# Patient Record
(demographics unavailable — no encounter records)

---

## 2020-08-12 NOTE — 2DMMODE
[facility]
[place]   [address]                   2 D/M-MODE ECHOCARDIOGRAM     
_______________________________________________________________________________
 
Name:       AUSTEN MATA                  Room #:                     REG [facility]#:      [id number]                       Account #:      [id number]  
Admission:  20    Attend Phys:    Raman Parrish MD
Discharge:              Date of Birth:  53  
                                                          Report #: [id number]
                                                                    [id number]
_______________________________________________________________________________
THIS REPORT FOR:  
 
cc:  Marely Kelly Beth RNP Park, Jin S. MD                                                   
                                                                       ~
 
--------------- APPROVED REPORT --------------
 
 
Study performed:  2020 13:36:26
 
EXAM: Comprehensive 2D, Doppler, and color-flow 
Echocardiogram 
Patient Location: Out-Patient   
      Status:  routine
 
   BSA:         2.06
HR: 87 bpm  
Rhythm: NSR  
 
Other Information 
Study Quality: Good
 
Indications
Palpitations
 
2D Dimensions
RVDd:  38.64 mm  
IVSd:  10.03 (7-11mm) LVOT Diam:  19.50 (18-24mm) 
LVDd:  48.63 mm  
PWd:  9.85 (7-11mm) Ascending Ao:  34.30 (22-36mm)
LVDs:  26.81 (25-40mm) 
Aortic Root:  37.34 mm 
 
Volumes
Left Atrial Volume (Systole) 
Single Plane 4CH:  48.97 mL Single Plane 2CH:  54.88 mL
    LA ESV Index:  27.00 mL/m2
 
Aortic Valve
AoV Peak Evelio.:  1.92 m/s 
AO Peak Gr.:  14.81 mmHg LVOT Max P.50 mmHg
    LVOT Max V:  1.70 m/s
HUA Vmax: 2.63 cm2  
 
 
 
[facility]
[address]
[place]  [address]
Phone:  [phone]                    2 D/M-MODE ECHOCARDIOGRAM     
_______________________________________________________________________________
 
Name:            AUSTEN MATA                  Room #:                    REG [facility]#:           [id number]          Account #:     [id number]  
Admission:       20         Attend Phys:   Raman Parrish
Discharge:                  Date of Birth: 53  
                         Report #:      [id number]
        [id number]
_______________________________________________________________________________
Mitral Valve
    E/A Ratio:  0.9
    MV Decel. Time:  186.31 ms
MV E Max Evelio.:  0.67 m/s 
MV A Evelio.:  0.77 m/s  
MV PHT:  54.03 ms  
IVRT:  66.90 ms   
 
Pulmonary Valve
PV Peak Evelio.:  1.44 m/s PV Peak Gr.:  8.33 mmHg
 
Pulmonary Vein
P Vein S:    0.79 m/s 
P Vein D:   0.40 m/s 
P Vein S/D Ratio:  1.98 
 
Tricuspid Valve
 RAP Estimate:  5.00 mmHg
 
Left Ventricle
The left ventricle is normal size. There is normal LV segmental wall 
motion. Mild basal septal hypertrophy is present. Left ventricular 
systolic function is normal. LVEF is 65-70%. Mild diastolic 
dysfunction is present (impaired relaxation pattern).
 
Right Ventricle
The right ventricle is normal size. The right ventricular systolic 
function is normal.
 
Atria
The left atrium size is normal. The right atrium size is 
normal.
 
Aortic Valve
The aortic valve is normal in structure. No aortic regurgitation is 
present. There is no aortic valvular stenosis.
 
Mitral Valve
The mitral valve is normal in structure. Trace mitral regurgitation. 
No evidence of mitral valve stenosis.
 
Tricuspid Valve
The tricuspid valve is normal in structure. There is no tricuspid 
valve regurgitation noted. Unable to assess PA pressure.
 
Pulmonic Valve
 
 
[facility]
[address]
[place]  [address]
Phone:  [phone]                    2 D/M-MODE ECHOCARDIOGRAM     
_______________________________________________________________________________
 
Name:            AUSTEN MATA                  Room #:                    REG CL
M.R.#:           [id number]          Account #:     [id number]  
Admission:       20         Attend Phys:   Raman Parrish
Discharge:                  Date of Birth: 53  
                         Report #:      [id number]
        [id number]
_______________________________________________________________________________
The pulmonary valve is normal in structure. Trace pulmonic 
regurgitation.
 
Great Vessels
The aortic root is normal in size. The ascending aorta is normal in 
size. IVC is normal in size and collapses >50% with 
inspiration.
 
<Conclusion>
The left ventricle is normal size.
Left ventricular systolic function is normal.
Mild diastolic dysfunction is present (impaired relaxation 
pattern).
The right ventricle is normal size.
The left atrium size is normal.
The aortic valve is normal in structure.
Trace mitral regurgitation.
 
 
 
 
 
 
 
 
 
 
 
 
 
 
 
 
 
 
 
 
 
 
 
 
 
 
 
  <ELECTRONICALLY SIGNED>
   By: Marcial Trinidad MD               
  20     1423
D: 20 1423                           _____________________________________
T: 20 1423                           Marcial Trinidad MD                 /INF